# Patient Record
Sex: FEMALE | Race: WHITE | NOT HISPANIC OR LATINO | Employment: STUDENT | URBAN - METROPOLITAN AREA
[De-identification: names, ages, dates, MRNs, and addresses within clinical notes are randomized per-mention and may not be internally consistent; named-entity substitution may affect disease eponyms.]

---
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
## 2024-05-15 ENCOUNTER — OFFICE VISIT (OUTPATIENT)
Dept: PULMONOLOGY | Facility: CLINIC | Age: 19
End: 2024-05-15
Payer: MEDICAID

## 2024-05-15 VITALS
WEIGHT: 288.25 LBS | DIASTOLIC BLOOD PRESSURE: 82 MMHG | HEART RATE: 96 BPM | BODY MASS INDEX: 45.15 KG/M2 | OXYGEN SATURATION: 97 % | SYSTOLIC BLOOD PRESSURE: 124 MMHG

## 2024-05-15 DIAGNOSIS — R06.09 DYSPNEA ON EXERTION: ICD-10-CM

## 2024-05-15 DIAGNOSIS — J31.0 CHRONIC RHINITIS: ICD-10-CM

## 2024-05-15 DIAGNOSIS — J45.909 MODERATE ASTHMA WITHOUT COMPLICATION, UNSPECIFIED WHETHER PERSISTENT: Primary | ICD-10-CM

## 2024-05-15 DIAGNOSIS — R05.3 CHRONIC COUGH: ICD-10-CM

## 2024-05-15 PROCEDURE — 99205 OFFICE O/P NEW HI 60 MIN: CPT | Mod: S$PBB,,,

## 2024-05-15 PROCEDURE — 99999 PR PBB SHADOW E&M-EST. PATIENT-LVL III: CPT | Mod: PBBFAC,,,

## 2024-05-15 PROCEDURE — 3079F DIAST BP 80-89 MM HG: CPT | Mod: CPTII,,,

## 2024-05-15 PROCEDURE — 1159F MED LIST DOCD IN RCRD: CPT | Mod: CPTII,,,

## 2024-05-15 PROCEDURE — 3074F SYST BP LT 130 MM HG: CPT | Mod: CPTII,,,

## 2024-05-15 PROCEDURE — 3008F BODY MASS INDEX DOCD: CPT | Mod: CPTII,,,

## 2024-05-15 PROCEDURE — 99213 OFFICE O/P EST LOW 20 MIN: CPT | Mod: PBBFAC,PN

## 2024-05-15 RX ORDER — BUDESONIDE AND FORMOTEROL FUMARATE DIHYDRATE 160; 4.5 UG/1; UG/1
2 AEROSOL RESPIRATORY (INHALATION) EVERY 12 HOURS
Qty: 10.2 G | Refills: 11 | Status: SHIPPED | OUTPATIENT
Start: 2024-05-15 | End: 2025-05-15

## 2024-05-15 RX ORDER — ALBUTEROL SULFATE 90 UG/1
2 AEROSOL, METERED RESPIRATORY (INHALATION) EVERY 4 HOURS PRN
COMMUNITY

## 2024-05-15 RX ORDER — CLOBETASOL PROPIONATE 0.5 MG/G
CREAM TOPICAL
COMMUNITY
Start: 2024-05-09

## 2024-05-15 NOTE — PROGRESS NOTES
History and Physical Note  [facility]    Subjective:     Reason for visit: Cough    Patient ID:  Sofie Cedeño is a 18 y.o. female    Interval History:  Miss. Carrizales presents as a referral from her PCP for evaluation of a persistent cough and asthma management. She developed a cough from January to March that led to one UC evaluation and one ED evaluation. No improvement and she followed up with her PCP in March that recommended starting asthma treatment. She was started on Symbicort two puffs twice daily for controller and albuterol for rescue therapy. She was given a steroid injection that day. Her cough has improved since she has since starting these treatments.     She struggles with audible wheezing and persistent cough. Non-productive cough. No viral illness in the last 3 months. She struggles with seasonal allergies with no relief from antihistamines. She uses Zicam occasionally with some relief. Flonase did not help her.     These symptoms did first start once she was away at college during her first winter season there. No clearly identifiable triggers that may have caused a flare up in her asthma.     Today in clinic, has atopic dermatitis on both legs. She is treating with a topical steroid cream.     She is not regularly bothered gastric reflux. Frequent strep throat growing. She has not seen an ENT.     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 She is very involved in [employer] and is a [employer]enh[employer]. She went to [employer] while living here.     Additional Pulmonary History:  Occupational/Environmental Exposures: unclear triggers  Exposure to Animals/Pets: 6 dogs that have lived indoors for many years  Childhood Illnesses:  Bronchitis   Tobacco/Smoking: Life time-non smoker    Social History     Tobacco Use   Smoking Status Never   Smokeless Tobacco Never       Objective:     Vitals:    05/15/24 1609   BP: 124/82   BP Location: Left arm   Patient Position: Sitting    BP Method: Large (Automatic)   Pulse: 96   SpO2: 97%   Weight: 130.7 kg (288 lb 4 oz)         Physical Exam  Constitutional:       Appearance: Normal appearance. She is well-developed.   HENT:      Head: Normocephalic.   Cardiovascular:      Rate and Rhythm: Normal rate.      Pulses: Normal pulses.      Heart sounds: Normal heart sounds, S1 normal and S2 normal.   Pulmonary:      Effort: Pulmonary effort is normal.      Breath sounds: Normal breath sounds and air entry. No decreased breath sounds.   Musculoskeletal:      Right lower leg: No edema.      Left lower leg: No edema.   Skin:     General: Skin is warm.      Capillary Refill: Capillary refill takes less than 2 seconds.      Findings: No rash.      Nails: There is no clubbing.   Neurological:      Mental Status: She is alert and oriented to person, place, and time. Mental status is at baseline.   Psychiatric:         Attention and Perception: Attention normal.         Mood and Affect: Mood and affect normal.         Speech: Speech normal.         Behavior: Behavior is cooperative.          Personal Diagnostic Review and Interpretation  02/25/2024 CXR: lungs clear       Pertinent Studies Reviewed & Interpreted:     Pulmonary Function Tests:   pending    Other Pertinent Laboratories:  Lab Results   Component Value Date    WBC 10.61 04/22/2019    RBC 4.78 04/22/2019    HGB 13.3 04/22/2019    MCV 85 04/22/2019    MCH 27.8 04/22/2019    MCHC 32.8 04/22/2019    RDW 13.0 04/22/2019     (H) 04/22/2019    MPV 9.3 04/22/2019    GRAN 70.0 (H) 04/22/2019    LYMPH CANCELED 04/22/2019    LYMPH 18.0 (L) 04/22/2019    MONO CANCELED 04/22/2019    MONO 11.0 04/22/2019    EOS CANCELED 04/22/2019    BASO CANCELED 04/22/2019        02/2024 OSH report     Assessment & Plan:       Plan:  Clinical impression is resonably certain and repeated evaluation prn +/- follow up will be needed as below.      1. Moderate asthma without complication, unspecified whether  persistent  Overview:  17 yo lifetime non-smoker presenting for evaluation of persistent cough and wheezing. She has been taking symbicort and albuterol for the last two months with improvements in symptoms. Eos 02/2024 elevated up to 700.  +atopic dermatitis & +allergic rhinitis. 2 asthma exacerbations this year requiring ED evaluation and one involving a steroid injection.     Assessment & Plan:  Obtain pulmonary function test to evaluate for severity of obstruction  Obtain CBC and IgE to evaluate for inflammatory asthma markers  Continue Symbicort two puffs twice daily for control. Rinse mouth after each use. Rx sent to local pharmacy.   Continue albuterol rescue inhaler for shortness of breath or wheezing.     Orders:  -     budesonide-formoterol 160-4.5 mcg (SYMBICORT) 160-4.5 mcg/actuation HFAA; Inhale 2 puffs into the lungs every 12 (twelve) hours. Controller  Dispense: 10.2 g; Refill: 11  -     Ambulatory referral/consult to ENT; Future; Expected date: 05/22/2024  -     Complete PFT with bronchodilator; Future    2. Chronic cough  -     Cancel: Complete PFT with bronchodilator; Future  -     CBC auto differential; Future; Expected date: 05/15/2024  -     IGE; Future; Expected date: 05/15/2024    3. Dyspnea on exertion  -     Cancel: Complete PFT with bronchodilator; Future  -     CBC auto differential; Future; Expected date: 05/15/2024  -     IGE; Future; Expected date: 05/15/2024  -     Complete PFT with bronchodilator; Future    4. Chronic rhinitis  -     Ambulatory referral/consult to ENT; Future; Expected date: 05/22/2024        No follow-ups on file.    Discussed with patient above for education the following:      Patient Instructions   -Obtain PFT to evaluate level of obstruction    RETURN TO CLINIC IN 2 MONTHS     Total professional time spent for the encounter: 60 minutes  Time was spent preparing to see the patient, reviewing results of prior testing, obtaining and/or reviewing separately obtained  history, performing a medically appropriate examination and interview, counseling and educating the patient/family, ordering medications/tests/procedures, referring and communicating with other health care professionals, documenting clinical information in the electronic health record, and independently interpreting results.    Kavita Samuel, DNP

## 2024-05-15 NOTE — ASSESSMENT & PLAN NOTE
Obtain pulmonary function test to evaluate for severity of obstruction  Obtain CBC and IgE to evaluate for inflammatory asthma markers  Continue Symbicort two puffs twice daily for control. Rinse mouth after each use. Rx sent to local pharmacy.   Continue albuterol rescue inhaler for shortness of breath or wheezing.

## 2024-05-31 ENCOUNTER — OFFICE VISIT (OUTPATIENT)
Dept: OTOLARYNGOLOGY | Facility: CLINIC | Age: 19
End: 2024-05-31
Payer: MEDICAID

## 2024-05-31 VITALS — BODY MASS INDEX: 45.65 KG/M2 | WEIGHT: 291.44 LBS

## 2024-05-31 DIAGNOSIS — J34.2 NASAL SEPTAL DEVIATION: ICD-10-CM

## 2024-05-31 DIAGNOSIS — J34.3 NASAL TURBINATE HYPERTROPHY: ICD-10-CM

## 2024-05-31 DIAGNOSIS — R09.81 CHRONIC NASAL CONGESTION: Primary | ICD-10-CM

## 2024-05-31 DIAGNOSIS — J45.909 MODERATE ASTHMA WITHOUT COMPLICATION, UNSPECIFIED WHETHER PERSISTENT: ICD-10-CM

## 2024-05-31 PROCEDURE — 99204 OFFICE O/P NEW MOD 45 MIN: CPT | Mod: 25,S$PBB,, | Performed by: OTOLARYNGOLOGY

## 2024-05-31 PROCEDURE — 92511 NASOPHARYNGOSCOPY: CPT | Mod: PBBFAC | Performed by: OTOLARYNGOLOGY

## 2024-05-31 PROCEDURE — 99999 PR PBB SHADOW E&M-EST. PATIENT-LVL III: CPT | Mod: PBBFAC,,, | Performed by: OTOLARYNGOLOGY

## 2024-05-31 PROCEDURE — 99213 OFFICE O/P EST LOW 20 MIN: CPT | Mod: PBBFAC | Performed by: OTOLARYNGOLOGY

## 2024-05-31 PROCEDURE — 3008F BODY MASS INDEX DOCD: CPT | Mod: CPTII,,, | Performed by: OTOLARYNGOLOGY

## 2024-05-31 PROCEDURE — 1159F MED LIST DOCD IN RCRD: CPT | Mod: CPTII,,, | Performed by: OTOLARYNGOLOGY

## 2024-05-31 PROCEDURE — 92511 NASOPHARYNGOSCOPY: CPT | Mod: S$PBB,,, | Performed by: OTOLARYNGOLOGY

## 2024-05-31 RX ORDER — AZELASTINE 1 MG/ML
1 SPRAY, METERED NASAL 2 TIMES DAILY
Qty: 30 ML | Refills: 6 | Status: SHIPPED | OUTPATIENT
Start: 2024-05-31 | End: 2025-05-31

## 2024-05-31 RX ORDER — FLUTICASONE PROPIONATE 50 MCG
2 SPRAY, SUSPENSION (ML) NASAL 2 TIMES DAILY
Qty: 16 ML | Refills: 6 | Status: SHIPPED | OUTPATIENT
Start: 2024-05-31 | End: 2024-06-30

## 2024-05-31 NOTE — PROGRESS NOTES
Pediatric Otolaryngology- Head & Neck Surgery   New Patient Visit    Chief Complaint: Chronic nasal obstruction    HPI  Sofie Cedeño is a 18 y.o. old female referred to the pediatric otolaryngology clinic for chronic nasal obstruction, which has been present for approximately   months.  she does   have frequent mouth breathing and nasal obstruction.      occ rhinorrhea. This is clear. The rhinorrhea does not turn yellow/green.  no cough.  no fevers and symptoms of sinusitis requiring antibiotics.      + snoring and mouth breathing at night, without witnessed apneas.      she has   been on medications for the nasal symptoms.  The parents describe the problem as severe.    she has   history of allergies, has not had previous allergy testing.              Medical History  Past Medical History:   Diagnosis Date    Otitis media     Strep pharyngitis summer 2014, 11/2014       Surgical History  No past surgical history on file.    Medications  Current Outpatient Medications on File Prior to Visit   Medication Sig Dispense Refill    albuterol (PROVENTIL/VENTOLIN HFA) 90 mcg/actuation inhaler Inhale 2 puffs into the lungs every 4 (four) hours as needed. (Patient not taking: Reported on 5/31/2024)      budesonide-formoterol 160-4.5 mcg (SYMBICORT) 160-4.5 mcg/actuation HFAA Inhale 2 puffs into the lungs every 12 (twelve) hours. Controller (Patient not taking: Reported on 5/31/2024) 10.2 g 11    cetirizine (ZYRTEC) 10 MG tablet Take 10 mg by mouth nightly. (Patient not taking: Reported on 5/15/2024)      clobetasoL (TEMOVATE) 0.05 % cream SMARTSIG:Sparingly Topical Twice Daily (Patient not taking: Reported on 5/31/2024)      fluticasone propionate (FLONASE) 50 mcg/actuation nasal spray 1 spray by Each Nostril route 2 (two) times daily. (Patient not taking: Reported on 5/15/2024)      ibuprofen (ADVIL,MOTRIN) 600 MG tablet Take 1 tablet (600 mg total) by mouth every 6 (six) hours as needed for Pain. (Patient not taking:  Reported on 5/31/2024) 20 tablet 0    loratadine (CLARITIN ORAL) Take by mouth. (Patient not taking: Reported on 5/15/2024)      medroxyPROGESTERone (DEPO-PROVERA) 150 mg/mL Syrg INJECT 1 INTRAMUSCULARLY ONCE EVERY 3 MONTHS (Patient not taking: Reported on 5/31/2024)      propranoloL (INDERAL) 20 MG tablet Take 20 mg by mouth 2 (two) times daily. (Patient not taking: Reported on 5/15/2024)      sertraline (ZOLOFT) 50 MG tablet Take 50 mg by mouth. (Patient not taking: Reported on 5/15/2024)       No current facility-administered medications on file prior to visit.       Allergies  Review of patient's allergies indicates:  No Known Allergies    Social History  There are no smokers in the home    Family History  There is no family history of bleeding disorders or problems with anesthesia.           Physical Exam  General:  Alert, well developed, comfortable, mouth breathing  Voice:  Regular for age, good volume  Respiratory:  Symmetric breathing, no stridor, no distress  Head:  Normocephalic, no lesions  Face: Symmetric, HB 1/6 bilat, no lesions, no obvious sinus tenderness, salivary glands nontender  Eyes:  Sclera white, extraocular movements intact  Nose: Dorsum straight, septum w L deviation, enlarged turbinate size, normal mucosa  Right Ear: Pinna and external ear appears normal, EAC patent, TM intact, mobile, without middle ear effusion  Left Ear: Pinna and external ear appears normal, EAC patent, TM intact, mobile, without middle ear effusion  Hearing:  Grossly intact  Oral cavity: Healthy mucosa, no masses or lesions including lips, teeth, gums, floor of mouth, palate, or tongue.  Oropharynx: Tonsils 3+, palate intact, normal pharyngeal wall movement  Neck: Supple, no palpable nodes, no masses, trachea midline, no thyroid masses  Cardiovascular system:  Pulses regular in both upper extremities, good skin turgor   Neuro: CN II-XII intact, moves all extremities spontaneously  Skin: no rash    Procedure:  Flexible  fiberoptic nasopharyngoscopy  Surgeon:  Alfa Willis MD     Detail:  After confirming patient and verbal consent, the nose was anesthetized with topical lidocaine and afrin.  The flexible fiberoptic endoscope was passed through the left nostril revealing enlarged turbinates. There was no pus or polyps in the nasal cavity. The sope was then advanced to the nasopharynx revealing no regrowth of obstructive adenoid tissue.  The flexible fiberoptic endoscope was passed through the right nostril revealing enlarged turbinates. There was no pus or polyps in the nasal cavity. The scope was then removed and the patient tolerated the procedure well.      Impression    1. Chronic nasal congestion  Ambulatory referral/consult to ENT      2. Moderate asthma without complication, unspecified whether persistent  Ambulatory referral/consult to ENT      3. Nasal turbinate hypertrophy        4. Nasal septal deviation            Chronic nasal obstruction, with left septal deviation and turbinate hypertrophy.   I discussed the options, which include watchful waiting versus SMRT versus medical therapy with a nasal steroid and rinses.       She doesn't want surgery - also discussed option of vivair- if she wants this will refer to dr jorgensen or adriana    Treatment Plan  - trial of flonase and rinses    Alfa Willis MD  Pediatric Otolaryngology Attending

## 2024-05-31 NOTE — PATIENT INSTRUCTIONS
"Pediatric Sinus Rinse Kit Instructions:    Nasal rinses or irrigation may help your sinus symptoms by washing away mucus, allergy causing particles and irritants such as pollens, dust particles, pollutants and bacteria, which may help decrease inflammation of the sinus lining. This may help to fight sinus infections and reduce allergies symptoms.     First purchase a Rinse bottle +/- mixing ingredients:    Here are some examples from the company [employer] which you can purchase over the counter at most drug stores.         Encourage your child to "Brush their nose" themselves. I find that if you can get them to do the spray then it is much more effective.     Visit: http://www.[employer].com/Use-a-[employer]med-Sinus-Rinse for more detailed and easy to follow instructions on how to use the kit. It has pictures!    IF you don't want to buy the pre-made packets you can also make a similar solution at home:      Assemble ingredients:   Salt: Kosher or pickling or kyle salt   Water: Use boiled or purified water. Room temperature water or slightly warmer will make it more comfortable   Baking soda: Not powder     Avoid using if your child has any of the following:   Ear infection   Recent ear surgery   Completely block nasal or ear passage   Swallowing disorder     Cleaning of rinse container:   After each use, wash out with warm soapy water and rinse thoroughly, air dry on a paper towel. Weekly clean with a solution of 2 TBS of white distilled vinegar and one-cup of water.     Some Tips:   Breathe through your mouth or hold breath while rinsing   Stop rinsing if you need to sneeze or cough   Dont talk while rinsing   Rinse at least 1-2 hours before bedtime     Instructions for Rinsin. Wash hands   2. Fill up bottle with 8 oz. room temperature or slightly warmer water   3. Add 1 teaspoon salt and ¼ teaspoon baking soda or add Sinus Rinse packet   4. Put cap on bottle and place finger over hole and shake until mixture is " dissolved   5. Stand over the sink and bend forward, and tilt head forward   6. Put the cap tightly up to the nasal passage and squeeze bottle gently using ¼ of the solution   7. When finished, blow nose with both nostrils open and repeat to use half of the solution then blow nose again   8. Repeat the same process on the other nasal passage     A You-tube video of other children doing this can be seen at :    Child doing rinse: https://www.youtube.com/watch?v=QSRI0ek3SU8  Parent administering rinse: https://www.youtube.com/watch?v=aZgueuvJIsQ

## 2024-06-27 ENCOUNTER — OFFICE VISIT (OUTPATIENT)
Dept: PULMONOLOGY | Facility: CLINIC | Age: 19
End: 2024-06-27
Payer: MEDICAID

## 2024-06-27 VITALS
HEART RATE: 94 BPM | RESPIRATION RATE: 19 BRPM | DIASTOLIC BLOOD PRESSURE: 80 MMHG | WEIGHT: 293 LBS | SYSTOLIC BLOOD PRESSURE: 128 MMHG | HEIGHT: 67 IN | BODY MASS INDEX: 45.99 KG/M2 | OXYGEN SATURATION: 98 %

## 2024-06-27 DIAGNOSIS — L20.9 ATOPIC DERMATITIS, UNSPECIFIED TYPE: ICD-10-CM

## 2024-06-27 DIAGNOSIS — J45.909 MODERATE ASTHMA WITHOUT COMPLICATION, UNSPECIFIED WHETHER PERSISTENT: Primary | ICD-10-CM

## 2024-06-27 DIAGNOSIS — J30.9 ALLERGIC RHINITIS, UNSPECIFIED SEASONALITY, UNSPECIFIED TRIGGER: ICD-10-CM

## 2024-06-27 PROCEDURE — 3079F DIAST BP 80-89 MM HG: CPT | Mod: CPTII,,,

## 2024-06-27 PROCEDURE — 99999 PR PBB SHADOW E&M-EST. PATIENT-LVL III: CPT | Mod: PBBFAC,,,

## 2024-06-27 PROCEDURE — 1159F MED LIST DOCD IN RCRD: CPT | Mod: CPTII,,,

## 2024-06-27 PROCEDURE — 3074F SYST BP LT 130 MM HG: CPT | Mod: CPTII,,,

## 2024-06-27 PROCEDURE — 3008F BODY MASS INDEX DOCD: CPT | Mod: CPTII,,,

## 2024-06-27 PROCEDURE — 99213 OFFICE O/P EST LOW 20 MIN: CPT | Mod: PBBFAC,PN

## 2024-06-27 PROCEDURE — 99214 OFFICE O/P EST MOD 30 MIN: CPT | Mod: S$PBB,,,

## 2024-06-27 RX ORDER — AZITHROMYCIN 250 MG/1
TABLET, FILM COATED ORAL
Qty: 6 TABLET | Refills: 2 | Status: SHIPPED | OUTPATIENT
Start: 2024-06-27 | End: 2024-07-02

## 2024-06-27 RX ORDER — MEDROXYPROGESTERONE ACETATE 150 MG/ML
150 INJECTION, SUSPENSION INTRAMUSCULAR ONCE
COMMUNITY
Start: 2024-01-10

## 2024-06-27 RX ORDER — PREDNISONE 20 MG/1
20 TABLET ORAL DAILY
Qty: 5 TABLET | Refills: 2 | Status: SHIPPED | OUTPATIENT
Start: 2024-06-27

## 2024-06-27 NOTE — ASSESSMENT & PLAN NOTE
Asthma Management Plan   -Symbicort for maintenance therapy. 2 puffs twice daily. Rinse mouth after each use.   -Albuterol for rescue therapy. 1-2 puffs as needed for shortness of breath or wheezing  -Duonebs treatments for shortness of breath or wheezing.      Asthma Action plan  Azithromycin 500 mg 1st day, 250mg for the following days  pill for three days for yellow or green mucous    Prednisone 20 mg pills, Take one pill a day for three days, repeat for shortness of breath or wheeze    Albuterol Inhaler 1-2 puffs every 4 hours, for cough or shortness of breath

## 2024-06-27 NOTE — PATIENT INSTRUCTIONS
Asthma Management Plan   -Symbicort for maintenance therapy. 2 puffs twice daily. Rinse mouth after each use.   -Albuterol for rescue therapy. 1-2 puffs as needed for shortness of breath or wheezing  -Duonebs treatments for shortness of breath or wheezing.      Asthma Action plan  Azithromycin 500 mg 1st day, 250mg for the following days  pill for three days for yellow or green mucous    Prednisone 20 mg pills, Take one pill a day for three days, repeat for shortness of breath or wheeze    Albuterol Inhaler 1-2 puffs every 4 hours, for cough or shortness of breath     no...

## 2024-06-27 NOTE — PROGRESS NOTES
History and Physical Note  [facility]    Subjective:     Reason for visit: Moderate asthma    Patient ID:  Sofie Cedeño is a 18 y.o. female    Interval History:  06/27/2024: this is a follow up appointment. PFT showed that asthma is well controlled currently on maintenance treatment. IgE level elevated. She was seen by ENT and had scope that revealed enlarged turbinates and septal deviation. Review asthma action plan in preparation for her departure back to college.     Coughing fit last night.   Wheezing: intermittently  Shortness of breath: daily  ACT score: 19; controlled  Respiratory illness:none  Last ED/UC visit: none  Current asthma treatment plan: symbicort and albuterol only once since we last saw each other in May 15th    05/15/2024:   Miss. Carrizales presents as a referral from her PCP for evaluation of a persistent cough and asthma management. She developed a cough from January to March that led to one UC evaluation and one ED evaluation. No improvement and she followed up with her PCP in March that recommended starting asthma treatment. She was started on Symbicort two puffs twice daily for controller and albuterol for rescue therapy. She was given a steroid injection that day. Her cough has improved since she has since starting these treatments.     She struggles with audible wheezing and persistent cough. Non-productive cough. No viral illness in the last 3 months. She struggles with seasonal allergies with no relief from antihistamines. She uses Zicam occasionally with some relief. Flonase did not help her.     These symptoms did first start once she was away at college during her first winter season there. No clearly identifiable triggers that may have caused a flare up in her asthma.     Today in clinic, has atopic dermatitis on both legs. She is treating with a topical steroid cream.     She is not regularly bothered gastric reflux. Frequent strep throat growing. She has not seen an  "ENT.     She is a full-time college student at [employer]! She is majoring in sociology. She is very involved in [employer] and is a [employer]. She went to [employer] while living here.     Additional Pulmonary History:  Occupational/Environmental Exposures: unclear triggers  Exposure to Animals/Pets: 6 dogs that have lived indoors for many years  Childhood Illnesses:  Bronchitis   Tobacco/Smoking: Life time-non smoker    Social History     Tobacco Use   Smoking Status Never   Smokeless Tobacco Never       Objective:     Vitals:    06/27/24 1501   BP: 128/80   BP Location: Left arm   Patient Position: Sitting   BP Method: Medium (Manual)   Pulse: 94   Resp: 19   SpO2: 98%   Weight: 134.8 kg (297 lb 1.1 oz)   Height: 5' 7" (1.702 m)           Physical Exam  Constitutional:       Appearance: Normal appearance. She is well-developed.   HENT:      Head: Normocephalic.   Cardiovascular:      Rate and Rhythm: Normal rate.      Pulses: Normal pulses.      Heart sounds: Normal heart sounds, S1 normal and S2 normal.   Pulmonary:      Effort: Pulmonary effort is normal.      Breath sounds: Normal breath sounds and air entry. No decreased breath sounds.   Musculoskeletal:      Right lower leg: No edema.      Left lower leg: No edema.   Skin:     General: Skin is warm.      Capillary Refill: Capillary refill takes less than 2 seconds.      Findings: No rash.      Nails: There is no clubbing.   Neurological:      Mental Status: She is alert and oriented to person, place, and time. Mental status is at baseline.   Psychiatric:         Attention and Perception: Attention normal.         Mood and Affect: Mood and affect normal.         Speech: Speech normal.         Behavior: Behavior is cooperative.          Personal Diagnostic Review and Interpretation  02/25/2024 CXR: lungs clear     Pertinent Studies Reviewed & Interpreted:     Pulmonary Function Tests:     PFT  06/27/2024    FVC 3.71 (88.6 % predicted),   FEV1 3.09 (84.6 % predicted), "   FEV1/FVC 94 no obstruction   TLC 4.23 (77.9 % predicted), mild restriction   DLCO 26.88 (90.5 % predicted),       Other Pertinent Laboratories:  Lab Results   Component Value Date    WBC 9.03 05/22/2024    RBC 5.08 05/22/2024    HGB 13.8 05/22/2024    MCV 84 05/22/2024    MCH 27.2 05/22/2024    MCHC 32.5 05/22/2024    RDW 12.4 05/22/2024     (H) 05/22/2024    MPV 9.5 05/22/2024    GRAN 5.6 05/22/2024    GRAN 61.7 05/22/2024    LYMPH 2.4 05/22/2024    LYMPH 26.5 05/22/2024    MONO 0.6 05/22/2024    MONO 6.6 05/22/2024    EOS 0.4 05/22/2024    BASO 0.03 05/22/2024      02/2024 OSH report    Latest Reference Range & Units 04/22/19 14:36 05/22/24 10:59   Eos # 0.0 - 0.5 K/uL CANCELED 0.4      Latest Reference Range & Units 05/22/24 10:59   Total IgE 0 - 100 IU/mL 126 (H)     Assessment & Plan:       Plan:  Clinical impression is resonably certain and repeated evaluation prn +/- follow up will be needed as below.      1. Moderate asthma without complication, unspecified whether persistent  Overview:  17 yo lifetime non-smoker presenting for evaluation of persistent cough and wheezing. She has been taking symbicort and albuterol for the last two months with improvements in symptoms. Eos 02/2024 elevated up to 700.  +atopic dermatitis & +allergic rhinitis. 2 asthma exacerbations this year requiring ED evaluation and one involving a steroid injection.     Assessment & Plan:     Asthma Management Plan   -Symbicort for maintenance therapy. 2 puffs twice daily. Rinse mouth after each use.   -Albuterol for rescue therapy. 1-2 puffs as needed for shortness of breath or wheezing  -Duonebs treatments for shortness of breath or wheezing.      Asthma Action plan  Azithromycin 500 mg 1st day, 250mg for the following days  pill for three days for yellow or green mucous    Prednisone 20 mg pills, Take one pill a day for three days, repeat for shortness of breath or wheeze    Albuterol Inhaler 1-2 puffs every 4 hours,  for cough or shortness of breath    Orders:  -     azithromycin (Z-YUNIEL) 250 MG tablet; Take 2 tablets by mouth on day 1; Take 1 tablet by mouth on days 2-5  Dispense: 6 tablet; Refill: 2  -     predniSONE (DELTASONE) 20 MG tablet; Take 1 tablet (20 mg total) by mouth once daily.  Dispense: 5 tablet; Refill: 2    2. Allergic rhinitis, unspecified seasonality, unspecified trigger    3. Atopic dermatitis, unspecified type            Discussed with patient above for education the following:      Patient Instructions   Asthma Management Plan   -Symbicort for maintenance therapy. 2 puffs twice daily. Rinse mouth after each use.   -Albuterol for rescue therapy. 1-2 puffs as needed for shortness of breath or wheezing  -Duonebs treatments for shortness of breath or wheezing.      Asthma Action plan  Azithromycin 500 mg 1st day, 250mg for the following days  pill for three days for yellow or green mucous    Prednisone 20 mg pills, Take one pill a day for three days, repeat for shortness of breath or wheeze    Albuterol Inhaler 1-2 puffs every 4 hours, for cough or shortness of breath      RETURN TO CLINIC in 12 months      Total professional time spent for the encounter: 60 minutes  Time was spent preparing to see the patient, reviewing results of prior testing, obtaining and/or reviewing separately obtained history, performing a medically appropriate examination and interview, counseling and educating the patient/family, ordering medications/tests/procedures, referring and communicating with other health care professionals, documenting clinical information in the electronic health record, and independently interpreting results.    Kavita Samuel, DNP

## 2025-06-05 ENCOUNTER — RESULTS FOLLOW-UP (OUTPATIENT)
Dept: OBSTETRICS AND GYNECOLOGY | Facility: CLINIC | Age: 20
End: 2025-06-05

## 2025-06-05 ENCOUNTER — OFFICE VISIT (OUTPATIENT)
Dept: OBSTETRICS AND GYNECOLOGY | Facility: CLINIC | Age: 20
End: 2025-06-05
Payer: COMMERCIAL

## 2025-06-05 VITALS
WEIGHT: 265 LBS | HEIGHT: 67 IN | BODY MASS INDEX: 41.59 KG/M2 | DIASTOLIC BLOOD PRESSURE: 70 MMHG | SYSTOLIC BLOOD PRESSURE: 102 MMHG

## 2025-06-05 DIAGNOSIS — Z30.011 INITIATION OF ORAL CONTRACEPTION: ICD-10-CM

## 2025-06-05 DIAGNOSIS — N92.6 IRREGULAR PERIODS: ICD-10-CM

## 2025-06-05 DIAGNOSIS — N76.0 ACUTE VAGINITIS: ICD-10-CM

## 2025-06-05 DIAGNOSIS — Z71.85 HPV VACCINE COUNSELING: ICD-10-CM

## 2025-06-05 DIAGNOSIS — N93.9 ABNORMAL UTERINE BLEEDING (AUB): ICD-10-CM

## 2025-06-05 DIAGNOSIS — Z01.419 ROUTINE GYNECOLOGICAL EXAMINATION: Primary | ICD-10-CM

## 2025-06-05 PROCEDURE — 99999 PR PBB SHADOW E&M-EST. PATIENT-LVL III: CPT | Mod: PBBFAC,,, | Performed by: STUDENT IN AN ORGANIZED HEALTH CARE EDUCATION/TRAINING PROGRAM

## 2025-06-05 RX ORDER — DROSPIRENONE AND ETHINYL ESTRADIOL 0.02-3(28)
1 KIT ORAL DAILY
Qty: 28 TABLET | Refills: 12 | Status: SHIPPED | OUTPATIENT
Start: 2025-06-05 | End: 2025-07-05

## 2025-06-08 ENCOUNTER — E-VISIT (OUTPATIENT)
Dept: URGENT CARE | Facility: CLINIC | Age: 20
End: 2025-06-08
Payer: COMMERCIAL

## 2025-06-08 DIAGNOSIS — L25.9 CONTACT DERMATITIS, UNSPECIFIED CONTACT DERMATITIS TYPE, UNSPECIFIED TRIGGER: Primary | ICD-10-CM

## 2025-06-08 RX ORDER — BETAMETHASONE VALERATE 1 MG/G
CREAM TOPICAL 2 TIMES DAILY
Qty: 15 G | Refills: 0 | Status: SHIPPED | OUTPATIENT
Start: 2025-06-08 | End: 2025-06-15

## 2025-06-08 NOTE — PATIENT INSTRUCTIONS
We appreciate you trusting us with your medical care. We hope you feel better soon. We will be happy to take care of you for all of your future medical needs.

## 2025-06-08 NOTE — PROGRESS NOTES
Patient ID: Sofie Cedeño is a 19 y.o. female.        E-Visit Time Tracking:   Day 1 Time (in minutes): 9  Total Time (in minutes): 9      Chief Complaint: General Illness (Entered automatically based on patient selection in [facility].) and Dermatitis      The patient initiated a request through [facility] on 6/8/2025 for evaluation and management with a chief complaint of General Illness (Entered automatically based on patient selection in [facility].) and Dermatitis     I evaluated the questionnaire submission on 06/08/2025.    [facility]-Medication    6/8/2025 10:42 AM CDT - Filed by Patient   What do you need help with? Medication Request   Do you agree to participate in an E-Visit? Yes   If you have any of the following symptoms, please present to your local emergency room or call 911:  I acknowledge   Medication requests for narcotics will not be addressed via an E-Visit.  Please schedule an appointment. I acknowledge   Do you have any of the following pregnancy-related conditions? None   Do you want to address a new or existing medication? I would like to start a new medication that I do not already take   What is the main issue you would like addressed today? I believe i have contact dermatitis on my fingers as a result of a certain nail polish. A friend with a similar issue reccomended i use Betamethasone Valterate topical cream to treat it.   What is the name of the medication that you would like to start? Betamethasone Valterate topical cream   Have you taken a similar medication in the past? Yes   What was the name of the similar medication? Mupirocin ointment   Why are you no longer on that medication? Medication does not work   What effect has your medication had on your problem? Less than expected    What medical condition is the  medication intended to treat? Contact dermatitis on fingers   Provide any additional information you feel is important.    Please attach any relevant images or  files    Are you able to take your vital signs? No         Encounter Diagnosis   Name Primary?    Contact dermatitis, unspecified contact dermatitis type, unspecified trigger Yes        No orders of the defined types were placed in this encounter.     Medications Ordered This Encounter   Medications    betamethasone valerate 0.1% (VALISONE) 0.1 % Crea     Sig: Apply topically 2 (two) times daily. for 7 days     Dispense:  15 g     Refill:  0        Follow up in about 2 days (around 6/10/2025), or if symptoms worsen or fail to improve.

## 2025-06-19 ENCOUNTER — PATIENT MESSAGE (OUTPATIENT)
Dept: OBSTETRICS AND GYNECOLOGY | Facility: CLINIC | Age: 20
End: 2025-06-19
Payer: COMMERCIAL